# Patient Record
Sex: FEMALE | Race: OTHER | Employment: OTHER | URBAN - METROPOLITAN AREA
[De-identification: names, ages, dates, MRNs, and addresses within clinical notes are randomized per-mention and may not be internally consistent; named-entity substitution may affect disease eponyms.]

---

## 2022-08-18 ENCOUNTER — COMPREHENSIVE EXAM (OUTPATIENT)
Dept: URBAN - METROPOLITAN AREA CLINIC 37 | Facility: CLINIC | Age: 77
End: 2022-08-18

## 2022-08-18 DIAGNOSIS — H40.013: ICD-10-CM

## 2022-08-18 DIAGNOSIS — H43.393: ICD-10-CM

## 2022-08-18 DIAGNOSIS — H26.493: ICD-10-CM

## 2022-08-18 DIAGNOSIS — H52.12: ICD-10-CM

## 2022-08-18 PROCEDURE — 92015 DETERMINE REFRACTIVE STATE: CPT

## 2022-08-18 PROCEDURE — 92004 COMPRE OPH EXAM NEW PT 1/>: CPT

## 2022-08-18 PROCEDURE — 92250 FUNDUS PHOTOGRAPHY W/I&R: CPT

## 2022-08-18 PROCEDURE — 92132 CPTRZD OPH DX IMG ANT SGM: CPT

## 2022-08-18 ASSESSMENT — VISUAL ACUITY
OS_CC: 20/40
OU_CC: 20/40
OD_CC: 20/80

## 2022-08-18 ASSESSMENT — TONOMETRY
OD_IOP_MMHG: 12
OS_IOP_MMHG: 14

## 2022-08-25 ENCOUNTER — ESTABLISHED PATIENT (OUTPATIENT)
Dept: URBAN - METROPOLITAN AREA CLINIC 39 | Facility: CLINIC | Age: 77
End: 2022-08-25

## 2022-08-25 ENCOUNTER — SURGERY/PROCEDURE (OUTPATIENT)
Dept: URBAN - METROPOLITAN AREA SURGERY 14 | Facility: SURGERY | Age: 77
End: 2022-08-25

## 2022-08-25 DIAGNOSIS — H26.493: ICD-10-CM

## 2022-08-25 PROCEDURE — 6682150 YAG CAPSULOTOMY

## 2022-08-25 PROCEDURE — 92014 COMPRE OPH EXAM EST PT 1/>: CPT

## 2022-08-25 ASSESSMENT — TONOMETRY
OD_IOP_MMHG: 14
OS_IOP_MMHG: 16

## 2022-08-25 ASSESSMENT — VISUAL ACUITY
OD_SC: 20/70-1
OS_SC: 20/200-1
OS_CC: 20/40-1
OD_CC: 20/70-1
OD_BAT: 20/100
OS_BAT: 20/200

## 2022-09-01 ENCOUNTER — ESTABLISHED PATIENT (OUTPATIENT)
Dept: URBAN - METROPOLITAN AREA CLINIC 37 | Facility: CLINIC | Age: 77
End: 2022-09-01

## 2022-09-01 DIAGNOSIS — H40.013: ICD-10-CM

## 2022-09-01 DIAGNOSIS — H43.393: ICD-10-CM

## 2022-09-01 DIAGNOSIS — Z96.1: ICD-10-CM

## 2022-09-01 PROCEDURE — 92134 CPTRZ OPH DX IMG PST SGM RTA: CPT

## 2022-09-01 PROCEDURE — 99213 OFFICE O/P EST LOW 20 MIN: CPT

## 2022-09-01 ASSESSMENT — VISUAL ACUITY
OD_CC: 20/60
OS_CC: 20/30
OU_CC: 20/30

## 2022-09-01 ASSESSMENT — TONOMETRY
OD_IOP_MMHG: 11
OS_IOP_MMHG: 11

## 2022-09-15 NOTE — PATIENT DISCUSSION
Patient has transient recurrent visual loss in the left eye. Recommend Carotid doppler ordered by PCP.

## 2023-01-04 ENCOUNTER — EMERGENCY VISIT (OUTPATIENT)
Dept: URBAN - METROPOLITAN AREA CLINIC 37 | Facility: CLINIC | Age: 78
End: 2023-01-04

## 2023-01-04 DIAGNOSIS — H16.9: ICD-10-CM

## 2023-01-04 PROCEDURE — 99213 OFFICE O/P EST LOW 20 MIN: CPT

## 2023-01-04 RX ORDER — NEOMYCIN SULFATE, POLYMYXIN B SULFATE AND DEXAMETHASONE 3.5; 10000; 1 MG/ML; [USP'U]/ML; MG/ML: 1 SUSPENSION OPHTHALMIC

## 2023-01-04 ASSESSMENT — VISUAL ACUITY
OD_CC: 20/50
OS_CC: 20/40
OU_CC: 20/40

## 2023-01-04 ASSESSMENT — TONOMETRY
OS_IOP_MMHG: 17
OD_IOP_MMHG: 13

## 2023-01-11 ENCOUNTER — FOLLOW UP (OUTPATIENT)
Dept: URBAN - METROPOLITAN AREA CLINIC 37 | Facility: CLINIC | Age: 78
End: 2023-01-11

## 2023-01-11 DIAGNOSIS — H16.9: ICD-10-CM

## 2023-01-11 PROCEDURE — 99213 OFFICE O/P EST LOW 20 MIN: CPT

## 2023-01-11 ASSESSMENT — VISUAL ACUITY
OU_CC: 20/40
OS_CC: 20/40
OD_CC: 20/40

## 2025-03-05 ENCOUNTER — CONSULTATION/EVALUATION (OUTPATIENT)
Age: 80
End: 2025-03-05

## 2025-03-05 DIAGNOSIS — D23.111: ICD-10-CM

## 2025-03-05 PROCEDURE — 67840 REMOVE EYELID LESION: CPT | Mod: E3

## 2025-03-05 PROCEDURE — 99202 OFFICE O/P NEW SF 15 MIN: CPT | Mod: 25

## 2025-03-05 PROCEDURE — 92285 EXTERNAL OCULAR PHOTOGRAPHY: CPT

## 2025-07-02 ENCOUNTER — COMPREHENSIVE EXAM (OUTPATIENT)
Age: 80
End: 2025-07-02

## 2025-07-02 DIAGNOSIS — H52.7: ICD-10-CM

## 2025-07-02 DIAGNOSIS — H35.3132: ICD-10-CM

## 2025-07-02 DIAGNOSIS — H35.373: ICD-10-CM

## 2025-07-02 DIAGNOSIS — H04.123: ICD-10-CM

## 2025-07-02 DIAGNOSIS — H18.513: ICD-10-CM

## 2025-07-02 PROCEDURE — 92014 COMPRE OPH EXAM EST PT 1/>: CPT

## 2025-07-02 PROCEDURE — 92134 CPTRZ OPH DX IMG PST SGM RTA: CPT

## 2025-07-02 PROCEDURE — 92015 DETERMINE REFRACTIVE STATE: CPT

## 2025-07-02 RX ORDER — SODIUM CHLORIDE 50 MG/G: OINTMENT OPHTHALMIC EVERY EVENING

## 2025-07-02 RX ORDER — SODIUM CHLORIDE 50 MG/ML: 1 SOLUTION OPHTHALMIC TWICE A DAY
